# Patient Record
(demographics unavailable — no encounter records)

---

## 2025-04-14 NOTE — HISTORY OF PRESENT ILLNESS
[de-identified] : 47 year old male presents for annual follow up, unilateral SHNL. Wearing hearing aid in right ear, mild decreased hearing. Very frequent right sided tinnitus.  Denies otalgia, otorrhea, dizziness vertigo, headaches related to ears, recent fevers or ear infections.Has Hearing Aids from [employer] - uses regularly.

## 2025-04-14 NOTE — DATA REVIEWED
[de-identified] : Left: Hearing -3kHz sloping to a mild SNHL Right: Mild to severe SNHL Type A tymps AU

## 2025-04-14 NOTE — PHYSICAL EXAM
[de-identified] : Obstructing cerumen removed AU using alligator & curette  - tolerated well - TMs normal post-removal.  [Midline] : trachea located in midline position [Normal] : no rashes